# Patient Record
Sex: FEMALE | Race: WHITE | URBAN - NONMETROPOLITAN AREA
[De-identification: names, ages, dates, MRNs, and addresses within clinical notes are randomized per-mention and may not be internally consistent; named-entity substitution may affect disease eponyms.]

---

## 2022-01-17 ENCOUNTER — OFFICE VISIT (OUTPATIENT)
Dept: OBGYN CLINIC | Age: 57
End: 2022-01-17
Payer: COMMERCIAL

## 2022-01-17 ENCOUNTER — HOSPITAL ENCOUNTER (OUTPATIENT)
Age: 57
Setting detail: SPECIMEN
Discharge: HOME | End: 2022-01-17

## 2022-01-17 VITALS
WEIGHT: 149.2 LBS | DIASTOLIC BLOOD PRESSURE: 80 MMHG | HEIGHT: 62 IN | SYSTOLIC BLOOD PRESSURE: 126 MMHG | BODY MASS INDEX: 27.46 KG/M2

## 2022-01-17 DIAGNOSIS — R30.0 DYSURIA: ICD-10-CM

## 2022-01-17 DIAGNOSIS — R39.15 URGENCY OF MICTURITION: Primary | ICD-10-CM

## 2022-01-17 DIAGNOSIS — R39.15 URGENCY OF MICTURITION: ICD-10-CM

## 2022-01-17 LAB
BILIRUBIN, POC: ABNORMAL
BLOOD URINE, POC: ABNORMAL
CLARITY, POC: ABNORMAL
COLOR, POC: ABNORMAL
GLUCOSE URINE, POC: ABNORMAL
KETONES, POC: ABNORMAL
LEUKOCYTE EST, POC: ABNORMAL
NITRITE, POC: POSITIVE
PH, POC: 6
PROTEIN, POC: ABNORMAL
SPECIFIC GRAVITY, POC: 1.02
UROBILINOGEN, POC: ABNORMAL

## 2022-01-17 PROCEDURE — 99213 OFFICE O/P EST LOW 20 MIN: CPT | Performed by: NURSE PRACTITIONER

## 2022-01-17 PROCEDURE — 81002 URINALYSIS NONAUTO W/O SCOPE: CPT | Performed by: NURSE PRACTITIONER

## 2022-01-17 RX ORDER — SULFAMETHOXAZOLE AND TRIMETHOPRIM 800; 160 MG/1; MG/1
1 TABLET ORAL 2 TIMES DAILY
Qty: 6 TABLET | Refills: 0 | Status: SHIPPED | OUTPATIENT
Start: 2022-01-17 | End: 2022-01-20

## 2022-01-17 NOTE — PROGRESS NOTES
PROBLEM VISIT     Date of service: 2022    Rita Gonzalez  Is a 61 y.o. female    PT's PCP is: Trent Porter MD     : 1965                                             Subjective:       No LMP recorded. Patient is postmenopausal.   OB History    Para Term  AB Living   1 1   1   1   SAB IAB Ectopic Molar Multiple Live Births             1      # Outcome Date GA Lbr Cory/2nd Weight Sex Delivery Anes PTL Lv   1  36 44w0d   F Vag-Spont   DOMINIK        Social History     Tobacco Use   Smoking Status Former Smoker   Smokeless Tobacco Never Used        Social History     Substance and Sexual Activity   Alcohol Use Yes    Comment: occ       Allergies: Vicodin hp [hydrocodone-acetaminophen]      Current Outpatient Medications:     sulfamethoxazole-trimethoprim (BACTRIM DS;SEPTRA DS) 800-160 MG per tablet, Take 1 tablet by mouth 2 times daily for 3 days, Disp: 6 tablet, Rfl: 0    Social History     Substance and Sexual Activity   Sexual Activity Yes    Partners: Male     Chief Complaint   Patient presents with    Dysuria     C/O urinary urgency with little output. Reports very uncomfortable to void        PE:  Vital Signs  Blood pressure 126/80, height 5' 2\" (1.575 m), weight 149 lb 3.2 oz (67.7 kg). HPI: Patient presents today with complaints of urinary urgency, dysuria and feeling of incomplete emptying. States symptoms onset yesterday. Denies any fevers, hematuria or flank pain. PT denies fever, chills, nausea and vomiting       Review of Systems   Constitutional: Negative. Genitourinary: Positive for difficulty urinating (little output ), dysuria and urgency. Negative for flank pain and hematuria. Physical Exam  Constitutional:       Appearance: Normal appearance. HENT:      Head: Normocephalic. Pulmonary:      Effort: Pulmonary effort is normal.   Abdominal:      Tenderness: There is no right CVA tenderness or left CVA tenderness.    Musculoskeletal: General: Normal range of motion. Neurological:      General: No focal deficit present. Mental Status: She is alert. Psychiatric:         Mood and Affect: Mood normal.         Behavior: Behavior normal.         Assessment and Plan          Diagnosis Orders   1. Urgency of micturition  POCT Urinalysis Dipstick (No Micro)    Culture, Urine    sulfamethoxazole-trimethoprim (BACTRIM DS;SEPTRA DS) 800-160 MG per tablet   2. Dysuria  POCT Urinalysis Dipstick (No Micro)    Culture, Urine    sulfamethoxazole-trimethoprim (BACTRIM DS;SEPTRA DS) 800-160 MG per tablet     urine culture sent. Encouraged to call if symptoms worsen      I am having Luciano Paul start on sulfamethoxazole-trimethoprim. Return in about 4 weeks (around 2/14/2022) for yearly. There are no Patient Instructions on file for this visit.     NEELA Singh NP,1/17/2022 12:07 PM

## 2022-01-19 LAB
CULTURE: ABNORMAL
Lab: ABNORMAL
SPECIMEN DESCRIPTION: ABNORMAL

## 2022-02-01 ENCOUNTER — TELEPHONE (OUTPATIENT)
Dept: OBGYN CLINIC | Age: 57
End: 2022-02-01

## 2022-02-01 RX ORDER — NITROFURANTOIN 25; 75 MG/1; MG/1
100 CAPSULE ORAL 2 TIMES DAILY
Qty: 20 CAPSULE | Refills: 0 | Status: SHIPPED | OUTPATIENT
Start: 2022-02-01 | End: 2022-02-11

## 2022-02-01 NOTE — TELEPHONE ENCOUNTER
Patient called stating that she was in recently for a UTI and was treated with Bactrim. She states that she was feeling much better after the Bactrim, but symptoms seem to have started again. She states that she is under a massive amount of stress right now and not sure if that is contributing to her symptoms. She states that symptoms are the exact as they were when she came in for the visit. She is drinking lot of water to try to flush things out. She is concerned with the upcoming snow that symptoms will get worse and she will not be able to get treatment if needed. She is questioning if she could get another round of Bactrim as she was only given 6 pills last time. She denies any s/s of a yeast infection. Can you please review and give recommendations?

## 2022-02-01 NOTE — TELEPHONE ENCOUNTER
Spoke to patient and reviewed Arnav's recommendations. Patient verbalized understanding and will call with any further questions/concerns.

## 2022-04-06 ENCOUNTER — OFFICE VISIT (OUTPATIENT)
Dept: OBGYN CLINIC | Age: 57
End: 2022-04-06
Payer: COMMERCIAL

## 2022-04-06 ENCOUNTER — HOSPITAL ENCOUNTER (OUTPATIENT)
Age: 57
Setting detail: SPECIMEN
Discharge: HOME | End: 2022-04-06

## 2022-04-06 VITALS — SYSTOLIC BLOOD PRESSURE: 127 MMHG | WEIGHT: 140.8 LBS | DIASTOLIC BLOOD PRESSURE: 85 MMHG | BODY MASS INDEX: 25.75 KG/M2

## 2022-04-06 DIAGNOSIS — Z01.419 WOMEN'S ANNUAL ROUTINE GYNECOLOGICAL EXAMINATION: Primary | ICD-10-CM

## 2022-04-06 DIAGNOSIS — Z12.4 ENCOUNTER FOR SCREENING FOR CERVICAL CANCER: ICD-10-CM

## 2022-04-06 PROCEDURE — 99396 PREV VISIT EST AGE 40-64: CPT | Performed by: NURSE PRACTITIONER

## 2022-04-06 ASSESSMENT — ENCOUNTER SYMPTOMS
DIARRHEA: 0
CONSTIPATION: 0
SHORTNESS OF BREATH: 0
ABDOMINAL PAIN: 0

## 2022-04-06 NOTE — PROGRESS NOTES
YEARLY PHYSICAL    Date of service: 2022    Ruben Murray  Is a 61 y.o.  single female    PT's PCP is: Quentin Marcus MD     : 1965                                         Chaperone for Intimate Exam   Chaperone was offered as part of the rooming process. Patient declined and agrees to continue with exam without a chaperone.  Chaperone: n/a      Subjective:       No LMP recorded. Patient is postmenopausal.     Are your menses regular: not applicable    OB History    Para Term  AB Living   1 1   1   1   SAB IAB Ectopic Molar Multiple Live Births             1      # Outcome Date GA Lbr Cory/2nd Weight Sex Delivery Anes PTL Lv   1  36 44w0d   F Vag-Spont   DOMINIK        Social History     Tobacco Use   Smoking Status Former Smoker   Smokeless Tobacco Never Used        Social History     Substance and Sexual Activity   Alcohol Use Yes    Comment: occ       Family History   Problem Relation Age of Onset    Heart Attack Maternal Grandmother     Lung Cancer Father     Cancer Father         lymphoma    Stroke Father     High Cholesterol Father     Heart Attack Mother     Stroke Mother     High Cholesterol Mother     Asthma Brother     Heart Attack Maternal Uncle        Any family history of breast or ovarian cancer: No    Any family history of blood clots: No      Allergies: Vicodin hp [hydrocodone-acetaminophen]    No current outpatient medications on file. Social History     Substance and Sexual Activity   Sexual Activity Not Currently    Partners: Male       Any bleeding or pain with intercourse: n/a    Last Yearly:  2019    Last pap: 2017    Last HPV: 2017    Last Mammogram: 2016?     Last colorectal screen- reports in last couple years, reports 10 year follow up    Do you do self breast exams: encouraged     Past Medical History:   Diagnosis Date    Abnormal Pap smear of cervix 2016    LGSIL    Complication of anesthesia     Endometriosis     High cholesterol        Past Surgical History:   Procedure Laterality Date    LAPAROSCOPY      endometriosis removal    TONSILLECTOMY         Family History   Problem Relation Age of Onset    Heart Attack Maternal Grandmother     Lung Cancer Father     Cancer Father         lymphoma    Stroke Father     High Cholesterol Father     Heart Attack Mother     Stroke Mother     High Cholesterol Mother     Asthma Brother     Heart Attack Maternal Uncle        Chief Complaint   Patient presents with    Gynecologic Exam     Last pap 7/26/17. Mammogram due. Voices no concerns. PE:  Vital Signs  Blood pressure 127/85, weight 140 lb 12.8 oz (63.9 kg). Estimated body mass index is 25.75 kg/m² as calculated from the following:    Height as of 1/17/22: 5' 2\" (1.575 m). Weight as of this encounter: 140 lb 12.8 oz (63.9 kg). HPI: Patient presents today for annual exam. Feeling well, voices no concerns. Denies breast/pelvic pain. Due for pap. Mammogram due. Wellness reviewed. Denies PMB. Enjoys walking in neighborhood. Review of Systems   Constitutional: Negative for chills, fatigue and fever. Respiratory: Negative for shortness of breath. Cardiovascular: Negative for chest pain. Gastrointestinal: Negative for abdominal pain, constipation and diarrhea. Genitourinary: Negative for dysuria, enuresis, frequency, menstrual problem, pelvic pain, urgency and vaginal bleeding. Neurological: Negative for dizziness, light-headedness and headaches. Physical Exam  Constitutional:       General: She is not in acute distress. Appearance: Normal appearance. She is not ill-appearing. Genitourinary:      Vulva normal.      No vaginal discharge. Right Adnexa: not tender. Left Adnexa: not tender. Uterus is not tender. Breasts:      Right: No mass, nipple discharge, skin change or tenderness.       Left: No mass, nipple discharge, skin change or tenderness. HENT:      Head: Normocephalic. Cardiovascular:      Rate and Rhythm: Normal rate and regular rhythm. Pulmonary:      Effort: Pulmonary effort is normal.      Breath sounds: Normal breath sounds. Abdominal:      Palpations: Abdomen is soft. Tenderness: There is no abdominal tenderness. There is no guarding or rebound. Musculoskeletal:         General: Normal range of motion. Neurological:      General: No focal deficit present. Mental Status: She is alert. Psychiatric:         Mood and Affect: Mood normal.         Behavior: Behavior normal.                            Assessment and Plan          Diagnosis Orders   1. Women's annual routine gynecological examination  PAP SMEAR   2. Encounter for screening for cervical cancer  PAP SMEAR       Repeat Annual every 1 year  Cervical Cytology Evaluation begins at 24years old. If Negative Cytology, Follow-up screening per current guidelines. Mammograms every 1year. If 37 yo and last mammogram was negative. Routine healthmaintenance per patients PCP. Mary Ellen Paul does not currently have medications on file. Return in about 1 year (around 4/6/2023) for yearly. She was also counseled on her preventative health maintenance recommendations and follow-up. There are no Patient Instructions on file for this visit.     NEELA Brand NP,4/6/2022 9:49 AM

## 2022-04-08 LAB
HPV SAMPLE: NORMAL
HPV, GENOTYPE 16: NOT DETECTED
HPV, GENOTYPE 18: NOT DETECTED
HPV, HIGH RISK OTHER: NOT DETECTED
HPV, INTERPRETATION: NORMAL
SPECIMEN DESCRIPTION: NORMAL

## 2022-04-13 LAB — CYTOLOGY REPORT: NORMAL

## 2023-07-11 ENCOUNTER — TELEPHONE (OUTPATIENT)
Dept: OBGYN CLINIC | Age: 58
End: 2023-07-11

## 2023-07-11 NOTE — TELEPHONE ENCOUNTER
Received a call from Dr. Nahomy Stevens office requesting patient's most recent pap results. Pap from 4/2022 faxed to his office at [phone].

## 2025-07-23 ENCOUNTER — HOSPITAL ENCOUNTER (OUTPATIENT)
Age: 60
Setting detail: SPECIMEN
Discharge: HOME | End: 2025-07-23

## 2025-07-23 ENCOUNTER — OFFICE VISIT (OUTPATIENT)
Dept: OBGYN CLINIC | Age: 60
End: 2025-07-23
Payer: COMMERCIAL

## 2025-07-23 VITALS
BODY MASS INDEX: 25.14 KG/M2 | HEIGHT: 62 IN | SYSTOLIC BLOOD PRESSURE: 130 MMHG | DIASTOLIC BLOOD PRESSURE: 84 MMHG | WEIGHT: 136.6 LBS

## 2025-07-23 DIAGNOSIS — Z01.419 WOMEN'S ANNUAL ROUTINE GYNECOLOGICAL EXAMINATION: Primary | ICD-10-CM

## 2025-07-23 PROCEDURE — 99386 PREV VISIT NEW AGE 40-64: CPT | Performed by: NURSE PRACTITIONER

## 2025-07-23 ASSESSMENT — ENCOUNTER SYMPTOMS
SHORTNESS OF BREATH: 0
DIARRHEA: 0
CONSTIPATION: 0
ABDOMINAL PAIN: 0

## 2025-07-23 ASSESSMENT — PATIENT HEALTH QUESTIONNAIRE - PHQ9: DEPRESSION UNABLE TO ASSESS: PT REFUSES

## 2025-07-23 NOTE — PROGRESS NOTES
YEARLY PHYSICAL    Date of service: 2025    Guerda Paul  Is a 60 y.o. female    PT's PCP is: Sacha Morrow MD     : 1965                                         Chaperone for Intimate Exam  Chaperone was offered as part of the rooming process. Patient declined and agrees to continue with exam without a chaperone.  Chaperone: n/a      Subjective:       No LMP recorded. Patient is postmenopausal.     Are your menses regular: not applicable    OB History    Para Term  AB Living   1 1  1  1   SAB IAB Ectopic Molar Multiple Live Births        1      # Outcome Date GA Lbr Cory/2nd Weight Sex Type Anes PTL Lv   1   36w0d   F Vag-Spont   DOMINIK        Social History     Tobacco Use   Smoking Status Former    Current packs/day: 0.00    Average packs/day: 1 pack/day for 20.9 years (20.9 ttl pk-yrs)    Types: Cigarettes    Start date: 1993    Quit date: 2013    Years since quittin.6   Smokeless Tobacco Never        Social History     Substance and Sexual Activity   Alcohol Use Yes    Comment: Occasionally       Family History   Problem Relation Age of Onset    Heart Attack Maternal Grandmother     Lung Cancer Father     Cancer Father         lymphoma    Stroke Father     High Cholesterol Father     Heart Attack Mother     Stroke Mother     High Cholesterol Mother     Asthma Brother     Heart Attack Maternal Uncle        Any family history of breast or ovarian cancer: No    Any family history of blood clots: No      Allergies: Vicodin hp [hydrocodone-acetaminophen]    No current outpatient medications on file.    Social History     Substance and Sexual Activity   Sexual Activity Not Currently    Partners: Male       Last Yearly:  22    Last pap: 22-neg    Last HPV: 22-neg    Last Mammogram: unsure- due    Last Dexascan n/a    Last colorectal screen-9 yrs ago    Do you do self breast exams:

## 2025-07-25 LAB
HPV I/H RISK 4 DNA CVX QL NAA+PROBE: DETECTED
HPV SAMPLE: ABNORMAL
HPV, INTERPRETATION: ABNORMAL
HPV16 DNA CVX QL NAA+PROBE: NOT DETECTED
HPV18 DNA CVX QL NAA+PROBE: NOT DETECTED
SPECIMEN DESCRIPTION: ABNORMAL

## 2025-08-13 LAB — CYTOLOGY REPORT: NORMAL

## 2025-08-14 ENCOUNTER — RESULTS FOLLOW-UP (OUTPATIENT)
Dept: OBGYN CLINIC | Age: 60
End: 2025-08-14